# Patient Record
Sex: FEMALE | Race: WHITE | Employment: UNEMPLOYED | URBAN - METROPOLITAN AREA
[De-identification: names, ages, dates, MRNs, and addresses within clinical notes are randomized per-mention and may not be internally consistent; named-entity substitution may affect disease eponyms.]

---

## 2020-06-14 ENCOUNTER — HOSPITAL ENCOUNTER (OUTPATIENT)
Facility: CLINIC | Age: 40
Setting detail: OBSERVATION
Discharge: LEFT BEFORE DISCHARGE | End: 2020-06-15
Attending: EMERGENCY MEDICINE | Admitting: HOSPITALIST

## 2020-06-14 ENCOUNTER — APPOINTMENT (OUTPATIENT)
Dept: CT IMAGING | Facility: CLINIC | Age: 40
End: 2020-06-14
Attending: EMERGENCY MEDICINE

## 2020-06-14 DIAGNOSIS — R41.82 ALTERED MENTAL STATUS, UNSPECIFIED ALTERED MENTAL STATUS TYPE: ICD-10-CM

## 2020-06-14 LAB
ALBUMIN SERPL-MCNC: 3.7 G/DL (ref 3.4–5)
ALP SERPL-CCNC: 57 U/L (ref 40–150)
ALT SERPL W P-5'-P-CCNC: 28 U/L (ref 0–50)
ANION GAP SERPL CALCULATED.3IONS-SCNC: 5 MMOL/L (ref 3–14)
AST SERPL W P-5'-P-CCNC: 17 U/L (ref 0–45)
BASOPHILS # BLD AUTO: 0 10E9/L (ref 0–0.2)
BASOPHILS NFR BLD AUTO: 0.4 %
BILIRUB DIRECT SERPL-MCNC: 0.2 MG/DL (ref 0–0.2)
BILIRUB SERPL-MCNC: 0.7 MG/DL (ref 0.2–1.3)
BUN SERPL-MCNC: 11 MG/DL (ref 7–30)
CALCIUM SERPL-MCNC: 9 MG/DL (ref 8.5–10.1)
CHLORIDE SERPL-SCNC: 107 MMOL/L (ref 94–109)
CO2 SERPL-SCNC: 24 MMOL/L (ref 20–32)
CREAT SERPL-MCNC: 1.01 MG/DL (ref 0.52–1.04)
DIFFERENTIAL METHOD BLD: NORMAL
EOSINOPHIL # BLD AUTO: 0.1 10E9/L (ref 0–0.7)
EOSINOPHIL NFR BLD AUTO: 1.6 %
ERYTHROCYTE [DISTWIDTH] IN BLOOD BY AUTOMATED COUNT: 12.6 % (ref 10–15)
ETHANOL SERPL-MCNC: <0.01 G/DL
GFR SERPL CREATININE-BSD FRML MDRD: 69 ML/MIN/{1.73_M2}
GLUCOSE SERPL-MCNC: 99 MG/DL (ref 70–99)
HCG SERPL QL: NEGATIVE
HCT VFR BLD AUTO: 41.7 % (ref 35–47)
HGB BLD-MCNC: 14.3 G/DL (ref 11.7–15.7)
IMM GRANULOCYTES # BLD: 0 10E9/L (ref 0–0.4)
IMM GRANULOCYTES NFR BLD: 0 %
INTERPRETATION ECG - MUSE: NORMAL
LYMPHOCYTES # BLD AUTO: 1.9 10E9/L (ref 0.8–5.3)
LYMPHOCYTES NFR BLD AUTO: 39.1 %
MCH RBC QN AUTO: 30.9 PG (ref 26.5–33)
MCHC RBC AUTO-ENTMCNC: 34.3 G/DL (ref 31.5–36.5)
MCV RBC AUTO: 90 FL (ref 78–100)
MONOCYTES # BLD AUTO: 0.5 10E9/L (ref 0–1.3)
MONOCYTES NFR BLD AUTO: 10.5 %
NEUTROPHILS # BLD AUTO: 2.4 10E9/L (ref 1.6–8.3)
NEUTROPHILS NFR BLD AUTO: 48.4 %
NRBC # BLD AUTO: 0 10*3/UL
NRBC BLD AUTO-RTO: 0 /100
PLATELET # BLD AUTO: 242 10E9/L (ref 150–450)
POTASSIUM SERPL-SCNC: 3.4 MMOL/L (ref 3.4–5.3)
PROT SERPL-MCNC: 8.5 G/DL (ref 6.8–8.8)
RBC # BLD AUTO: 4.63 10E12/L (ref 3.8–5.2)
SODIUM SERPL-SCNC: 136 MMOL/L (ref 133–144)
WBC # BLD AUTO: 5 10E9/L (ref 4–11)

## 2020-06-14 PROCEDURE — 80076 HEPATIC FUNCTION PANEL: CPT | Performed by: EMERGENCY MEDICINE

## 2020-06-14 PROCEDURE — 84703 CHORIONIC GONADOTROPIN ASSAY: CPT | Performed by: EMERGENCY MEDICINE

## 2020-06-14 PROCEDURE — 93005 ELECTROCARDIOGRAM TRACING: CPT

## 2020-06-14 PROCEDURE — 80048 BASIC METABOLIC PNL TOTAL CA: CPT | Performed by: EMERGENCY MEDICINE

## 2020-06-14 PROCEDURE — 96365 THER/PROPH/DIAG IV INF INIT: CPT

## 2020-06-14 PROCEDURE — 80320 DRUG SCREEN QUANTALCOHOLS: CPT | Performed by: EMERGENCY MEDICINE

## 2020-06-14 PROCEDURE — 83690 ASSAY OF LIPASE: CPT | Performed by: EMERGENCY MEDICINE

## 2020-06-14 PROCEDURE — 99285 EMERGENCY DEPT VISIT HI MDM: CPT | Mod: 25

## 2020-06-14 PROCEDURE — 85025 COMPLETE CBC W/AUTO DIFF WBC: CPT | Performed by: EMERGENCY MEDICINE

## 2020-06-14 PROCEDURE — 70450 CT HEAD/BRAIN W/O DYE: CPT

## 2020-06-14 ASSESSMENT — MIFFLIN-ST. JEOR: SCORE: 1085.92

## 2020-06-14 ASSESSMENT — ENCOUNTER SYMPTOMS: DIZZINESS: 1

## 2020-06-15 VITALS
WEIGHT: 102 LBS | TEMPERATURE: 97.2 F | SYSTOLIC BLOOD PRESSURE: 122 MMHG | OXYGEN SATURATION: 100 % | RESPIRATION RATE: 16 BRPM | DIASTOLIC BLOOD PRESSURE: 85 MMHG | BODY MASS INDEX: 18.77 KG/M2 | HEIGHT: 62 IN

## 2020-06-15 PROBLEM — R41.82 AMS (ALTERED MENTAL STATUS): Status: ACTIVE | Noted: 2020-06-15

## 2020-06-15 LAB
ALBUMIN UR-MCNC: NEGATIVE MG/DL
AMORPH CRY #/AREA URNS HPF: ABNORMAL /HPF
AMPHETAMINES UR QL SCN: POSITIVE
APPEARANCE UR: ABNORMAL
BARBITURATES UR QL: NEGATIVE
BENZODIAZ UR QL: NEGATIVE
BILIRUB UR QL STRIP: NEGATIVE
CANNABINOIDS UR QL SCN: NEGATIVE
COCAINE UR QL: NEGATIVE
COLOR UR AUTO: YELLOW
GLUCOSE UR STRIP-MCNC: NEGATIVE MG/DL
HGB UR QL STRIP: NEGATIVE
KETONES UR STRIP-MCNC: NEGATIVE MG/DL
LEUKOCYTE ESTERASE UR QL STRIP: NEGATIVE
LIPASE SERPL-CCNC: 186 U/L (ref 73–393)
NITRATE UR QL: NEGATIVE
OPIATES UR QL SCN: NEGATIVE
PCP UR QL SCN: NEGATIVE
PH UR STRIP: 7.5 PH (ref 5–7)
RBC #/AREA URNS AUTO: 0 /HPF (ref 0–2)
SOURCE: ABNORMAL
SP GR UR STRIP: 1.01 (ref 1–1.03)
SQUAMOUS #/AREA URNS AUTO: <1 /HPF (ref 0–1)
UROBILINOGEN UR STRIP-MCNC: NORMAL MG/DL (ref 0–2)
WBC #/AREA URNS AUTO: 0 /HPF (ref 0–5)

## 2020-06-15 PROCEDURE — 99220 ZZC INITIAL OBSERVATION CARE,LEVL III: CPT | Performed by: HOSPITALIST

## 2020-06-15 PROCEDURE — 81001 URINALYSIS AUTO W/SCOPE: CPT | Performed by: EMERGENCY MEDICINE

## 2020-06-15 PROCEDURE — 25000125 ZZHC RX 250: Performed by: EMERGENCY MEDICINE

## 2020-06-15 PROCEDURE — 25000128 H RX IP 250 OP 636: Performed by: EMERGENCY MEDICINE

## 2020-06-15 PROCEDURE — 80307 DRUG TEST PRSMV CHEM ANLYZR: CPT | Performed by: EMERGENCY MEDICINE

## 2020-06-15 PROCEDURE — G0378 HOSPITAL OBSERVATION PER HR: HCPCS

## 2020-06-15 PROCEDURE — 25800030 ZZH RX IP 258 OP 636: Performed by: EMERGENCY MEDICINE

## 2020-06-15 RX ORDER — HYDRALAZINE HYDROCHLORIDE 20 MG/ML
10 INJECTION INTRAMUSCULAR; INTRAVENOUS EVERY 4 HOURS PRN
Status: DISCONTINUED | OUTPATIENT
Start: 2020-06-15 | End: 2020-06-15 | Stop reason: HOSPADM

## 2020-06-15 RX ORDER — ACETAMINOPHEN 650 MG/1
650 SUPPOSITORY RECTAL EVERY 4 HOURS PRN
Status: DISCONTINUED | OUTPATIENT
Start: 2020-06-15 | End: 2020-06-15 | Stop reason: HOSPADM

## 2020-06-15 RX ORDER — POTASSIUM CHLORIDE 1.5 G/1.58G
20-40 POWDER, FOR SOLUTION ORAL
Status: DISCONTINUED | OUTPATIENT
Start: 2020-06-15 | End: 2020-06-15 | Stop reason: HOSPADM

## 2020-06-15 RX ORDER — LORAZEPAM 2 MG/ML
.5-1 INJECTION INTRAMUSCULAR EVERY 4 HOURS PRN
Status: DISCONTINUED | OUTPATIENT
Start: 2020-06-15 | End: 2020-06-15 | Stop reason: HOSPADM

## 2020-06-15 RX ORDER — ONDANSETRON 4 MG/1
4 TABLET, ORALLY DISINTEGRATING ORAL EVERY 6 HOURS PRN
Status: DISCONTINUED | OUTPATIENT
Start: 2020-06-15 | End: 2020-06-15 | Stop reason: HOSPADM

## 2020-06-15 RX ORDER — LORAZEPAM 2 MG/ML
1-2 INJECTION INTRAMUSCULAR EVERY 30 MIN PRN
Status: DISCONTINUED | OUTPATIENT
Start: 2020-06-15 | End: 2020-06-15 | Stop reason: HOSPADM

## 2020-06-15 RX ORDER — MULTIPLE VITAMINS W/ MINERALS TAB 9MG-400MCG
1 TAB ORAL DAILY
Status: DISCONTINUED | OUTPATIENT
Start: 2020-06-16 | End: 2020-06-15 | Stop reason: HOSPADM

## 2020-06-15 RX ORDER — SODIUM CHLORIDE 9 MG/ML
INJECTION, SOLUTION INTRAVENOUS CONTINUOUS
Status: DISCONTINUED | OUTPATIENT
Start: 2020-06-15 | End: 2020-06-15 | Stop reason: HOSPADM

## 2020-06-15 RX ORDER — AMOXICILLIN 250 MG
1 CAPSULE ORAL 2 TIMES DAILY PRN
Status: DISCONTINUED | OUTPATIENT
Start: 2020-06-15 | End: 2020-06-15 | Stop reason: HOSPADM

## 2020-06-15 RX ORDER — ONDANSETRON 2 MG/ML
4 INJECTION INTRAMUSCULAR; INTRAVENOUS EVERY 6 HOURS PRN
Status: DISCONTINUED | OUTPATIENT
Start: 2020-06-15 | End: 2020-06-15 | Stop reason: HOSPADM

## 2020-06-15 RX ORDER — POTASSIUM CHLORIDE 29.8 MG/ML
20 INJECTION INTRAVENOUS
Status: DISCONTINUED | OUTPATIENT
Start: 2020-06-15 | End: 2020-06-15 | Stop reason: HOSPADM

## 2020-06-15 RX ORDER — ACETAMINOPHEN 325 MG/1
650 TABLET ORAL EVERY 4 HOURS PRN
Status: DISCONTINUED | OUTPATIENT
Start: 2020-06-15 | End: 2020-06-15 | Stop reason: HOSPADM

## 2020-06-15 RX ORDER — LANOLIN ALCOHOL/MO/W.PET/CERES
100 CREAM (GRAM) TOPICAL DAILY
Status: DISCONTINUED | OUTPATIENT
Start: 2020-06-16 | End: 2020-06-15 | Stop reason: HOSPADM

## 2020-06-15 RX ORDER — LIDOCAINE 40 MG/G
CREAM TOPICAL
Status: DISCONTINUED | OUTPATIENT
Start: 2020-06-15 | End: 2020-06-15 | Stop reason: HOSPADM

## 2020-06-15 RX ORDER — BISACODYL 10 MG
10 SUPPOSITORY, RECTAL RECTAL DAILY PRN
Status: DISCONTINUED | OUTPATIENT
Start: 2020-06-15 | End: 2020-06-15 | Stop reason: HOSPADM

## 2020-06-15 RX ORDER — POLYETHYLENE GLYCOL 3350 17 G/17G
17 POWDER, FOR SOLUTION ORAL DAILY PRN
Status: DISCONTINUED | OUTPATIENT
Start: 2020-06-15 | End: 2020-06-15 | Stop reason: HOSPADM

## 2020-06-15 RX ORDER — POTASSIUM CHLORIDE 7.45 MG/ML
10 INJECTION INTRAVENOUS
Status: DISCONTINUED | OUTPATIENT
Start: 2020-06-15 | End: 2020-06-15 | Stop reason: HOSPADM

## 2020-06-15 RX ORDER — LORAZEPAM 1 MG/1
1-2 TABLET ORAL EVERY 30 MIN PRN
Status: DISCONTINUED | OUTPATIENT
Start: 2020-06-15 | End: 2020-06-15 | Stop reason: HOSPADM

## 2020-06-15 RX ORDER — POTASSIUM CHLORIDE 1500 MG/1
20-40 TABLET, EXTENDED RELEASE ORAL
Status: DISCONTINUED | OUTPATIENT
Start: 2020-06-15 | End: 2020-06-15 | Stop reason: HOSPADM

## 2020-06-15 RX ORDER — POTASSIUM CL/LIDO/0.9 % NACL 10MEQ/0.1L
10 INTRAVENOUS SOLUTION, PIGGYBACK (ML) INTRAVENOUS
Status: DISCONTINUED | OUTPATIENT
Start: 2020-06-15 | End: 2020-06-15 | Stop reason: HOSPADM

## 2020-06-15 RX ORDER — FOLIC ACID 1 MG/1
1 TABLET ORAL DAILY
Status: DISCONTINUED | OUTPATIENT
Start: 2020-06-16 | End: 2020-06-15 | Stop reason: HOSPADM

## 2020-06-15 RX ORDER — NALOXONE HYDROCHLORIDE 0.4 MG/ML
.1-.4 INJECTION, SOLUTION INTRAMUSCULAR; INTRAVENOUS; SUBCUTANEOUS
Status: DISCONTINUED | OUTPATIENT
Start: 2020-06-15 | End: 2020-06-15 | Stop reason: HOSPADM

## 2020-06-15 RX ORDER — MAGNESIUM SULFATE HEPTAHYDRATE 40 MG/ML
4 INJECTION, SOLUTION INTRAVENOUS EVERY 4 HOURS PRN
Status: DISCONTINUED | OUTPATIENT
Start: 2020-06-15 | End: 2020-06-15 | Stop reason: HOSPADM

## 2020-06-15 RX ORDER — AMOXICILLIN 250 MG
2 CAPSULE ORAL 2 TIMES DAILY PRN
Status: DISCONTINUED | OUTPATIENT
Start: 2020-06-15 | End: 2020-06-15 | Stop reason: HOSPADM

## 2020-06-15 RX ADMIN — FOLIC ACID: 5 INJECTION, SOLUTION INTRAMUSCULAR; INTRAVENOUS; SUBCUTANEOUS at 01:06

## 2020-06-15 ASSESSMENT — ENCOUNTER SYMPTOMS
HEADACHES: 0
FEVER: 0
CONFUSION: 1
SHORTNESS OF BREATH: 0
SEIZURES: 0
ABDOMINAL PAIN: 0
COUGH: 0

## 2020-06-15 NOTE — PROVIDER NOTIFICATION
MD Notification    Notified Person: MD    Notified Person Name: BEAU Neal    Notification Date/Time: 6/15/20, 0325    Notification Interaction: Telephone    Purpose of Notification: Patient refusing to be admitted on the unit. States she wants to go home. Left the unit but returned after much encouragement. Adamant she is going home    Orders Received: Allow patient to leave if she wants to     Comments:

## 2020-06-15 NOTE — PROGRESS NOTES
RECEIVING UNIT ED HANDOFF REVIEW    ED Nurse Handoff Report was reviewed by: Rosi Everett RN on Sangita 15, 2020 at 2:55 AM

## 2020-06-15 NOTE — ED PROVIDER NOTES
"  History     Chief Complaint:  Dizziness     HPI   Melissa Trevino is a 39 year old female who presents to the ED with dizziness.  She states she has a dizziness that makes her feel \"fast\".  She admits to having a \"slight\" change of ingesting something, but denies any possible drugs or alcohol.  She also denies any chest pain, abdominal pain, or any surgeries.  To note the patient lives with her boyfriend and was brought in by her boyfriend, Fazal.       Later when I was able to talk with the boyfriend by phone, the patients boyfriend explains that a few months ago she started to have neck and upper back pain that has progressively gotten worse, and also now includes her having the sensation of bites and tingling through out her body.  He states the patient complained the most about the neck and upper back pain.  The boyfriend noted the patient drinks regularly and that she has \"bad anxiety\".     Allergies:  No known drug allergies     Medications:    The patient is not currently taking any prescribed medications.     Past Medical History:    The patient does not have any past pertinent medical history.    Past Surgical History:    C section low transverse 3x    Family History:    History reviewed. No pertinent family history.     Social History:  Smoking status: Current every day smoker  Alcohol use: Yes  Drug use: No  PCP: Physician No Ref-Primary  Presents to the ED by her self  Marital Status:  Single [1]     Review of Systems   Constitutional: Negative for fever.   Respiratory: Negative for cough and shortness of breath.    Cardiovascular: Negative for chest pain.   Gastrointestinal: Negative for abdominal pain.   Skin: Negative for rash.   Neurological: Positive for dizziness. Negative for seizures and headaches.   Psychiatric/Behavioral: Positive for confusion.   All other systems reviewed and are negative.    Physical Exam     Patient Vitals for the past 24 hrs:   BP Temp Temp src Heart Rate Resp SpO2 " "Height Weight   06/15/20 0024 122/85 -- -- -- -- 100 % -- --   06/14/20 2157 126/84 97.2  F (36.2  C) Temporal 91 16 100 % 1.575 m (5' 2\") 46.3 kg (102 lb)       Physical Exam  Physical Exam   General:  Sitting on bed.   HENT:  No obvious trauma to head  Right Ear:  External ear normal.   Left Ear:  External ear normal.   Nose:  Nose normal.   Eyes:  Conjunctivae and EOM are normal. Pupils are equal, round, and reactive. Pupils are approximately 3 mm OU.  No nystagmus appreciated.  Neck: Normal range of motion. Neck supple. No tracheal deviation present.   CV:  Normal heart sounds. No murmur heard.  Pulm/Chest: Effort normal and breath sounds normal.   Abd: Soft. No distension. There is no tenderness. There is no rigidity, no rebound and no guarding.   M/S: Normal range of motion.   Neuro: Alert. GCS 15. CN II-XII Grossly intact, no pronator drift, normal finger-nose-finger, visual fields intact by confrontation. Muscle strength is +5 proximal and distal in the bilateral upper and lower extremities. No dysarthria. Normal palm up, palm down.  Skin: Skin is warm and dry. No rash noted. Not diaphoretic.   Psych: Normal mood and affect. Behavior is normal.       Emergency Department Course   ECG (22:11:52):  Rate 75 bpm. AR interval 160. QRS duration 68. QT/QTc 378/422. P-R-T axes 77 80 67. Normal sinus rhythm. Septal infarct, age undetermined. Abnormal ECG.  Interpreted at 2300 by Mainor Neal DO.    Imaging:  Radiology findings were communicated with the patient who voiced understanding of the findings.    CT Head without contrast:  Normal head CT    Imaging independently reviewed and agree with radiologist interpretation.     Laboratory:  Laboratory findings were communicated with the patient who voiced understanding of the findings.    CBC: WNL (WBC 5.0, HGB 14.3, )    BMP: WNL (Creatinine 1.01)    Hepatic panel: AWNL    Alcohol ethyl: <0.01    HCG Qualitative: negative     UA: pH urine 7.5 (H), " amorphous crystals moderate, o/w Negative    Drug abuse screen 77 urine: Amphetamine qual urine positive    Lipase: 186    Emergency Department Course:  Past medical records, nursing notes, and vitals reviewed.    2321 I performed an exam of the patient as documented above.     EKG obtained in the ED, see results above.   IV was inserted and blood was drawn for laboratory testing, results above.  The patient provided a urine sample here in the emergency department. This was sent for laboratory testing, findings above.  The patient was sent for a CT while in the emergency department, results above.     2354   I attempted to call patients boyfriend Mark, but he did not .    0028   I talked to the patients boyfriend.    0110 I rechecked the patient and discussed the results of her workup thus far.     Findings and plan explained to the patient who consents to admission. Discussed the patient with Dr. Vinny Neal, who will admit the patient to a obs bed for further monitoring, evaluation, and treatment.    I personally reviewed the laboratory and imaging results with the patient and answered all related questions prior to admission.     Impression & Plan   Medical Decision Making:  Melissa Trevino is a very pleasant 39 year old year old patient who presents to the emergency department with concern of altered mental status.  The patient is brought in by her boyfriend.  When I interviewed the patient, she was not very talkative.  She had some nonsensical statements.  The patient denied any pain for me.  She denied any chest pain, abdominal pain, headache, or neck pain.  Because of her confusion I performed a neurologic exam.  She had no focal neurologic deficit.  She was not febrile.  She is moving her head and had no meningeal signs.  Labs were checked and found to be unremarkable.  A CT of the head shows no intracranial bleed or stroke.    Later I was finally able to get a hold of the patient's boyfriend.   He reports the patient drinks on a regular basis.  Patient's blood sugar is normal. I considered wernicke's encephalopathy and provided her a banana bag with thiamine.  Urine drug screen did show methamphetamine.  The patient's boyfriend states that she is not suicidal and there is no chance that she would have overdosed on anything.  The patient additionally denies this.  Later in the patient's stay, she became a little bit more lucid.  She reports getting a bug [place] and indeed on her left lateral neck she has a scant area of erythema with a central bite erick.  There is no abscess.  There is no crepitus.  There is no necrosis.  The patient's boyfriend also mentioned that she has lots of chronic pain.  The patient denies any pain right now so I do not believe any additional advanced imaging is necessary.  Patient's pupils are 3 mm OU and reactive.  She mentioned dizziness initially.  There is no evidence of nystagmus to suggest BPPV.  I considered cerebellar stroke but there is no dysdiadochokinesia on exam.  An MRI may be indicated if the patient's symptoms change during hospitalization.    At this time I do not have definitive etiology for altered mental status.  This certainly may be substance use.  The patient will be admitted to the hospital for observation for continued evaluation and treatment to see if she develops any new symptoms.      Diagnosis:    ICD-10-CM    1. Altered mental status, unspecified altered mental status type  R41.82        Disposition:  Admitted to Dr. Vinny Nael.    Discharge Medications:  New Prescriptions    No medications on file       Scribe Disclosure:  I, Catrachito Hernandez, am serving as a scribe at 11:21 PM on 6/14/2020 to document services personally performed by Mainor Neal DO based on my observations and the provider's statements to me.        Mainor Neal DO  06/15/20 0155

## 2020-06-15 NOTE — DISCHARGE SUMMARY
[facility]    Discharge Summary  Hospitalist    Date of Admission:  6/14/2020  Date of Discharge:  6/15/2020  Discharging Provider: Matthew Neal  Date of Service (when I saw the patient): 06/15/20    Hospital Course   Melissa Trevino was admitted on 6/14/2020.  The following problems were addressed during her hospitalization:    Melissa Trevino is a 39 year old female who presents with dizziness. Admitted for further evaluation and treatment.      Altered mental status, suspicion for alcohol withdrawal versus amphetamine use: Patient reported to have dizziness and anxiety.  Patient does potentially have a risk of ingestion, however, denies drug or alcohol use.  However, boyfriend was noted to state that the patient does drink alcohol and drinks alcohol for anxiety.  Patient denies chest pain, abdominal pain, and lives with her boyfriend.  Patient does state that she has had neck and upper back pain which has been worsening and sensation of tingling throughout her body.  In the emergency departments electrolytes and kidney function is normal with normal liver function testing.  Lipase is 186 and qualitative serum hCG is negative.  Complete blood count shows normal white blood cell count and hemoglobin.  Urinalysis is negative for infection.  Ethanol level is less than 0.01.  Urine drug screen is positive for amphetamines.  CT of the head was completed showing normal head CT.  EKG was obtained showing sinus rhythm at a rate of 75 bpm. Patient refuses to answer many questions thus HPI and ROS may be limited.   -Psychiatry consulted.  -Supportive measures.  -Close hemodynamic monitoring.  -IV fluids.  -CIWA protocol.   -Consider CD consult.     Disposition: Discharged AMA.    Code Status   Full Code       Primary Care Physician   Physician No Ref-Primary    Physical Exam   Temp: 97.2  F (36.2  C) Temp src: Temporal BP: 122/85   Heart Rate: 91 Resp: 16 SpO2: 100 % O2 Device: None (Room air)     Vitals:    06/14/20 2157   Weight: 46.3 kg (102 lb)     Vital Signs with Ranges  Temp:  [97.2  F (36.2  C)] 97.2  F (36.2  C)  Heart Rate:  [91] 91  Resp:  [16] 16  BP: (122-126)/(84-85) 122/85  SpO2:  [100 %] 100 %  No intake/output data recorded.    GENERAL: Alert. NAD. Conversational, appropriate.   HEENT: Normocephalic. EOMI. No icterus or injection. Nares normal.   LUNGS: Clear to auscultation. No dyspnea at rest.   HEART: Regular rate. Extremities perfused.   ABDOMEN: Soft, nontender, and nondistended. Positive bowel sounds.   EXTREMITIES: No LE edema noted.   NEUROLOGIC: Moves extremities x4 on command. No acute focal neurologic abnormalities noted.     Discharge Disposition   Discharged AMA  Condition at discharge: Guarded    Consultations This Hospital Stay   PSYCHIATRY IP CONSULT  SOCIAL WORK IP CONSULT  CARE COORDINATOR IP CONSULT    Time Spent on this Encounter   I, Matthew Neal DO, personally saw the patient today and spent greater than 30 minutes discharging this patient.    Discharge Orders   No discharge procedures on file.  Discharge Medications   Discharge Medication List as of 6/15/2020  5:00 AM      CONTINUE these medications which have NOT CHANGED    Details   Ferrous Sulfate (IRON SUPPLEMENT PO) Take 325 mg by mouth.  , 325 mg, Oral, Until Discontinued, Historical      METRONIDAZOLE 500 MG OR TABS 1 tab BID x 7 days, Oral, Disp-14, R-0, Local Print      multivitamin, therapeutic (THERA-VIT) TABS Take 1 tablet by mouth daily.  , 1 tablet, Oral, DAILY, Until Discontinued, Historical           Allergies   No Known Allergies  Data   Most Recent 3 CBC's:  Recent Labs   Lab Test 06/14/20 2220   WBC 5.0   HGB 14.3   MCV 90         Most Recent 3 BMP's:  Recent Labs   Lab Test 06/14/20 2220      POTASSIUM 3.4   CHLORIDE 107   CO2 24   BUN 11   CR 1.01   ANIONGAP 5   JATINDER 9.0   GLC 99     Most Recent 2 LFT's:  Recent Labs   Lab Test 06/14/20 2220   AST 17   ALT 28   ALKPHOS 57    BILITOTAL 0.7     Most Recent INR's and Anticoagulation Dosing History:  Anticoagulation Dose History     There is no flowsheet data to display.        Most Recent 3 Troponin's:No lab results found.  Most Recent Cholesterol Panel:No lab results found.  Most Recent 6 Bacteria Isolates From Any Culture (See EPIC Reports for Culture Details):No lab results found.  Most Recent TSH, T4 and A1c Labs:No lab results found.  Results for orders placed or performed during the hospital encounter of 06/14/20   Head CT w/o contrast    Narrative    EXAM: CT HEAD W/O CONTRAST  LOCATION: [facility]  DATE/TIME: 6/14/2020 11:55 PM    INDICATION: Altered level of consciousness (LOC), unexplained, dizziness  COMPARISON: None.  TECHNIQUE: Routine without IV contrast. Multiplanar reformats. Dose reduction techniques were used.    FINDINGS:  INTRACRANIAL CONTENTS: No intracranial hemorrhage, extraaxial collection, or mass effect.  No CT evidence of acute infarct. Normal parenchymal attenuation. Normal ventricles and sulci.     VISUALIZED ORBITS/SINUSES/MASTOIDS: No intraorbital abnormality. No paranasal sinus mucosal disease. No middle ear or mastoid effusion.    BONES/SOFT TISSUES: No acute abnormality.      Impression    IMPRESSION:  1.  Normal head CT.

## 2020-06-15 NOTE — ED NOTES
"[facility]  ED Nurse Handoff Report    ED Chief complaint: Dizziness (Intermittant dizziness for the last few hours. Concurrent headache. Feels off balance. )      ED Diagnosis:   Final diagnoses:   Altered mental status, unspecified altered mental status type       Code Status: to be assessed by admitting provider     Allergies: No Known Allergies    Patient Story: Patient arrives to the ED today with dizziness and altered mental status . Patient report feeling dizzy. Patient significant other reports that she has been having neck and back pain for a while. Patients boyfriend states she has been drinking frequently recently. Patients urine tested positive for amphetamines   Focused Assessment:    Resp: wdl   Cardiac: WDL   Neuro: Patient is alert and oriented X4   GI: WDL   : WDL     Treatments and/or interventions provided: Fluids   Patient's response to treatments and/or interventions: none     To be done/followed up on inpatient unit:  continue to monitor     Does this patient have any cognitive concerns?: none     Activity level - Baseline/Home:  Independent  Activity Level - Current:   Independent    Patient's Preferred language: English   Needed?: No    Isolation: None  Infection: Not Applicable  Bariatric?: No    Vital Signs:   Vitals:    06/14/20 2157 06/15/20 0024   BP: 126/84 122/85   Resp: 16    Temp: 97.2  F (36.2  C)    TempSrc: Temporal    SpO2: 100% 100%   Weight: 46.3 kg (102 lb)    Height: 1.575 m (5' 2\")        Cardiac Rhythm:     Was the PSS-3 completed:   Yes  What interventions are required if any?               Family Comments: none   OBS brochure/video discussed/provided to patient/family: Yes              Name of person given brochure if not patient: n.a               Relationship to patient: n.a     For the majority of the shift this patient's behavior was Green.   Behavioral interventions performed were none .    ED NURSE PHONE NUMBER: *[phone]         "

## 2020-06-15 NOTE — ED NOTES
Signed out by Dr. Neal.  Arrived dizzy and not making sense.  Work up negative except for amphetamines on UDS.  Observation admission arranged to Dr. Vinny Neal.         Kelly Gregorio MD  06/15/20 0689

## 2020-06-15 NOTE — PROGRESS NOTES
Patient arrived to the unit in wheelchair. Got out of wheelchair and immediately left the room, walking along the hallway heading for the stair well. Attempts were made to speak with patient but patient exited the unit using the stairs. Stated she was going home and she was not staying. Patient encouraged to stay on the unit while writer called the Doctor. Refused and continued along the stairs to the basement. Patient encouraged to return to the unit and complied but remained adamant that she was going home. Dr Neal was notified that patient that patient refused to stay. Called her boyfriend to pick her up. Discharge AMA was discussed with her. Patient unwilling to wait and speak with Doctor. PIV was removed. Patient left the unit after signing discharge AMA form. Patient was accompanied to 1st floor by ARACELI to meet her ride.

## 2020-06-15 NOTE — H&P
[facility]    History and Physical  Hospitalist       Date of Admission:  6/14/2020  Date of Service (when I saw the patient): 06/15/20    Assessment & Plan   Melissa Trevino is a 39 year old female who presents with dizziness. Admitted for further evaluation and treatment.     Altered mental status, suspicion for alcohol withdrawal versus amphetamine use: Patient reported to have dizziness and anxiety.  Patient does potentially have a risk of ingestion, however, denies drug or alcohol use.  However, boyfriend was noted to state that the patient does drink alcohol and drinks alcohol for anxiety.  Patient denies chest pain, abdominal pain, and lives with her boyfriend.  Patient does state that she has had neck and upper back pain which has been worsening and sensation of tingling throughout her body.  In the emergency departments electrolytes and kidney function is normal with normal liver function testing.  Lipase is 186 and qualitative serum hCG is negative.  Complete blood count shows normal white blood cell count and hemoglobin.  Urinalysis is negative for infection.  Ethanol level is less than 0.01.  Urine drug screen is positive for amphetamines.  CT of the head was completed showing normal head CT.  EKG was obtained showing sinus rhythm at a rate of 75 bpm. Patient refuses to answer many questions thus HPI and ROS may be limited.   -Psychiatry consulted.  -Supportive measures.  -Close hemodynamic monitoring.  -IV fluids.  -CIWA protocol.   -Consider CD consult.    Code Status: Full Code    Disposition: Observation.    Dr. Matthew Neal D.O.  [facility] Hospitalist  Pager [phone]    Primary Care Physician   Physician No Ref-Primary    Chief Complaint   Dizziness    History is obtained from the patient and medical records.     History of Present Illness   Melissa Trevino is a 39 year old female who presents with dizziness. Admitted for further evaluation and treatment.  Altered mental status, suspicion for alcohol withdrawal versus amphetamine use: Patient reported to have dizziness and anxiety.  Patient does potentially have a risk of ingestion, however, denies drug or alcohol use.  However, boyfriend was noted to state that the patient does drink alcohol and drinks alcohol for anxiety.  Patient denies chest pain, abdominal pain, and lives with her boyfriend.  Patient does state that she has had neck and upper back pain which has been worsening and sensation of tingling throughout her body.  In the emergency departments electrolytes and kidney function is normal with normal liver function testing.  Lipase is 186 and qualitative serum hCG is negative.  Complete blood count shows normal white blood cell count and hemoglobin.  Urinalysis is negative for infection.  Ethanol level is less than 0.01.  Urine drug screen is positive for amphetamines.  CT of the head was completed showing normal head CT.  EKG was obtained showing sinus rhythm at a rate of 75 bpm.    Past Medical History    I have reviewed this patient's medical history and updated it with pertinent information if needed.   Past Medical History:   Diagnosis Date     NO ACTIVE PROBLEMS        Past Surgical History   I have reviewed this patient's surgical history and updated it with pertinent information if needed.  Past Surgical History:   Procedure Laterality Date     C/SECTION, LOW TRANSVERSE      , Low Transverse X 3       Prior to Admission Medications   Prior to Admission Medications   Prescriptions Last Dose Informant Patient Reported? Taking?   Ferrous Sulfate (IRON SUPPLEMENT PO)   Yes No   Sig: Take 325 mg by mouth.     METRONIDAZOLE 500 MG OR TABS   No No   Si tab BID x 7 days   multivitamin, therapeutic (THERA-VIT) TABS   Yes No   Sig: Take 1 tablet by mouth daily.        Facility-Administered Medications: None     Allergies   No Known Allergies    Social History   I have reviewed this patient's  social history and updated it with pertinent information if needed. Melissa Trevino  reports that she has been smoking. She has been smoking about 0.25 packs per day. She does not have any smokeless tobacco history on file. She reports current alcohol use. She reports that she does not use drugs.    Family History   I have reviewed this patient's family history and updated it with pertinent information if needed.     Review of Systems   The 10 point Review of Systems is negative other than noted in the HPI or here.     Physical Exam   Temp: 97.2  F (36.2  C) Temp src: Temporal BP: 122/85   Heart Rate: 91 Resp: 16 SpO2: 100 % O2 Device: None (Room air)    Vital Signs with Ranges  Temp:  [97.2  F (36.2  C)] 97.2  F (36.2  C)  Heart Rate:  [91] 91  Resp:  [16] 16  BP: (122-126)/(84-85) 122/85  SpO2:  [100 %] 100 %  102 lbs 0 oz    GENERAL: Alert. NAD. Conversational, appropriate.   HEENT: Normocephalic. EOMI. No icterus or injection. Nares normal.   LUNGS: Clear to auscultation. No dyspnea at rest.   HEART: Regular rate. Extremities perfused.   ABDOMEN: Soft, nontender, and nondistended. Positive bowel sounds.   EXTREMITIES: No LE edema noted.   NEUROLOGIC: Moves extremities x4 on command. No acute focal neurologic abnormalities noted.     Data   Data reviewed today:  I personally reviewed both laboratory and imaging data.   Recent Labs   Lab 06/14/20  2220   WBC 5.0   HGB 14.3   MCV 90         POTASSIUM 3.4   CHLORIDE 107   CO2 24   BUN 11   CR 1.01   ANIONGAP 5   JATINDER 9.0   GLC 99   ALBUMIN 3.7   PROTTOTAL 8.5   BILITOTAL 0.7   ALKPHOS 57   ALT 28   AST 17   LIPASE 186       Recent Results (from the past 24 hour(s))   Head CT w/o contrast    Narrative    EXAM: CT HEAD W/O CONTRAST  LOCATION: [facility]  DATE/TIME: 6/14/2020 11:55 PM    INDICATION: Altered level of consciousness (LOC), unexplained, dizziness  COMPARISON: None.  TECHNIQUE: Routine without IV contrast. Multiplanar reformats.  Dose reduction techniques were used.    FINDINGS:  INTRACRANIAL CONTENTS: No intracranial hemorrhage, extraaxial collection, or mass effect.  No CT evidence of acute infarct. Normal parenchymal attenuation. Normal ventricles and sulci.     VISUALIZED ORBITS/SINUSES/MASTOIDS: No intraorbital abnormality. No paranasal sinus mucosal disease. No middle ear or mastoid effusion.    BONES/SOFT TISSUES: No acute abnormality.      Impression    IMPRESSION:  1.  Normal head CT.